# Patient Record
(demographics unavailable — no encounter records)

---

## 2024-11-15 NOTE — CARDIOLOGY SUMMARY
[No Ischemia] : no Ischemia [LVEF ___%] : LVEF [unfilled]% [___] : [unfilled] [de-identified] : 5/22-mild plaque without stenosis

## 2024-11-15 NOTE — HISTORY OF PRESENT ILLNESS
[FreeTextEntry1] : 81 year old female with hypertension, dyslipidemia, abnormal cardiac CTA, carotid artery atherosclerosis, orthostatic hypotension, anxiety.

## 2024-11-15 NOTE — REASON FOR VISIT
[FreeTextEntry1] : Jannet comes for BP f/u She reports that in July her PCP stopped her ramipril for elevated kidney numbers, her BP remained okay until 2 months ago where some elevation was noted- metoprolol was then increased to 25mg BID She has noticed that BP remains high and is running 140-160s at home Denies CP/SOB/palps She will be seeing nephrology next month

## 2024-11-15 NOTE — REVIEW OF SYSTEMS
[Negative] : Respiratory [Weight Gain (___ Lbs)] : no recent weight gain [Weight Loss (___ Lbs)] : no recent weight loss [SOB] : no shortness of breath [Dyspnea on exertion] : not dyspnea during exertion [Chest Discomfort] : no chest discomfort [Lower Ext Edema] : no extremity edema [Palpitations] : no palpitations [Orthopnea] : no orthopnea [Syncope] : no syncope [Dizziness] : no dizziness [Easy Bleeding] : no tendency for easy bleeding [Easy Bruising] : no tendency for easy bruising

## 2024-12-11 NOTE — REVIEW OF SYSTEMS
[Weight Gain (___ Lbs)] : no recent weight gain [Weight Loss (___ Lbs)] : no recent weight loss [SOB] : no shortness of breath [Dyspnea on exertion] : not dyspnea during exertion [Chest Discomfort] : no chest discomfort [Lower Ext Edema] : no extremity edema [Palpitations] : no palpitations [Orthopnea] : no orthopnea [Syncope] : no syncope [Dizziness] : no dizziness [Easy Bleeding] : no tendency for easy bleeding [Easy Bruising] : no tendency for easy bruising

## 2024-12-11 NOTE — REASON FOR VISIT
[FreeTextEntry1] : Jannet comes for BP f/u She reports that in July her PCP stopped her ramipril for elevated kidney numbers, her BP remained okay until 3 months ago where some elevation was noted- metoprolol was then increased to 25mg BID last visit amlodipine 5mg added Jannet reports improved BP at home 116-140 mostly 120/130s One day had significant swelling of RLE after wearing elastic knee brace on knee, resolved after removing   Denies CP/SOB/palps She will be seeing nephrology next month

## 2025-01-30 NOTE — HISTORY OF PRESENT ILLNESS
[FreeTextEntry1] : Date: 2022 9:00 AM    Patient Name: BENJAMIN GOODEN    MRN: [id number]    Account Number:    [id number]    Gender: Female     (age): 2/15/1943 (79)    Instrument(s):    [id number]-[id number])    Attending/Fellow:    Thom Pinedo MD    Technician:    Shonda Hidalgo, Endoscopy Technician    Procedure Room #:    PROCEDURE ROOM 1        Anesthesia Provider:    Brain Barba MD    Nurse:    Alexus Chavez MD    Indications:    Abdominal pain: 789.00 - R10.9    Constipation: 564.09 - K59.09    Procedure:    The procedure, indications, preparation and potential complications were  explained to the patient, who indicated understanding and signed the  corresponding consent forms. MAC was administered by the anesthesiologist.  Continuous pulse oximetry and blood pressure monitoring were used throughout the  procedure. Supplemental oxygen was used. Patient was placed in left lateral  decubitus position. Digital exam was normal. The colonoscope was introduced  through the rectum and advanced under direct visualization until cecum was  reached. The appendiceal orifice and the ileocecal valve were identified. The  terminal ileum was identified. Careful visualization was performed as the  instrument was withdrawn. Patient tolerance to procedure was excellent. The  procedure was not difficult.    Limitations/Complications:    There were no apparent limitations or complications    Findings:    Excavated lesions Several diverticula were seen in the the left side of the  colon. Diverticulosis appeared to be of moderate severity.    Protruding lesions Small grade/stage II internal hemorrhoids were noted.    Impressions:    Moderate diverticulosis of the the left side of the colon.    Grade/Stage II internal hemorrhoids.    Plan:    High Fiber Diet    Colonoscopy in 3-5 years pending results    Additional Notes:    L -sided diverticulosis w loop fixation and    lumen narrowing,  2 + hypertrophy w 1+ spasm

## 2025-01-30 NOTE — ASSESSMENT
[FreeTextEntry1] : Feels sensation of incomplete evacuation.  Patient prefers a CT over repeating colonoscopy at this time.  Continue fiber, recommend increasing to bulk stools. Continue adequate water intake.  Recommend colonoscopy pending CT results.

## 2025-01-30 NOTE — HISTORY OF PRESENT ILLNESS
[FreeTextEntry1] : Date: 2022 9:00 AM    Patient Name: BENJAMIN GOODEN    MRN: [id number]    Account Number:    [id number]    Gender: Female     (age): 2/15/1943 (79)    Instrument(s):    [id number])    Attending/Fellow:    Thom Pinedo MD    Technician:    Shonda Hidalgo, Endoscopy Technician    Procedure Room #:    PROCEDURE ROOM 1        Anesthesia Provider:    Brain Barba MD    Nurse:    Alexus Chavez MD    Indications:    Abdominal pain: 789.00 - R10.9    Constipation: 564.09 - K59.09    Procedure:    The procedure, indications, preparation and potential complications were  explained to the patient, who indicated understanding and signed the  corresponding consent forms. MAC was administered by the anesthesiologist.  Continuous pulse oximetry and blood pressure monitoring were used throughout the  procedure. Supplemental oxygen was used. Patient was placed in left lateral  decubitus position. Digital exam was normal. The colonoscope was introduced  through the rectum and advanced under direct visualization until cecum was  reached. The appendiceal orifice and the ileocecal valve were identified. The  terminal ileum was identified. Careful visualization was performed as the  instrument was withdrawn. Patient tolerance to procedure was excellent. The  procedure was not difficult.    Limitations/Complications:    There were no apparent limitations or complications    Findings:    Excavated lesions Several diverticula were seen in the the left side of the  colon. Diverticulosis appeared to be of moderate severity.    Protruding lesions Small grade/stage II internal hemorrhoids were noted.    Impressions:    Moderate diverticulosis of the the left side of the colon.    Grade/Stage II internal hemorrhoids.    Plan:    High Fiber Diet    Colonoscopy in 3-5 years pending results    Additional Notes:    L -sided diverticulosis w loop fixation and    lumen narrowing,  2 + hypertrophy w 1+ spasm

## 2025-03-20 NOTE — ASSESSMENT
[FreeTextEntry1] : -complicated diverticulitis - resolved -known chronically edematous spastic sigmoid - seeing Judith and Dr. Jefferson at [facility] next wk for surgical consultation/f/u.  Will repeat CT scan and probably move in direction of preop colonoscopy after scan, adequate convalescence, and surgical f/u.  High fiber diet.  -dyspepsia - will start ppi - consider EGD at time of colonoscopy - avoid NSAIDs.  PMD/consultation/hospital notes and Labs/imaging/prior endoscopic results reviewed to extent noted in HPI; and, if procedure code billed on this visit for lab draw, this serves to signify that labs were drawn here in this office. Pt also advised that any studies ordered, if prior authorization required it may take up to a week to confirm - and if pt has not heard RE: scheduling by a week, they should call this office.

## 2025-03-20 NOTE — ASSESSMENT
[FreeTextEntry1] : -complicated diverticulitis - resolved -known chronically edematous spastic sigmoid - seeing Judith and Dr. Jefferson at [facility] next wk for surgical consultation/f/u.  Will repeat CT scan and probably move in direction of preop colonoscopy after scan, adequate convalescence, and surgical f/u.  High fiber diet.  -dyspepsia - will start ppi - consider EGD at time of colonoscopy - avoid NSAIDs.  PMD/consultation/hospital notes and Labs/imaging/prior endoscopic results reviewed to extent noted in HPI; and, if procedure code billed on this visit for lab draw, this serves to signify that labs were drawn here in this office. Pt also advised that any studies ordered, if prior authorization required it may take up to a week to confirm - and if pt has not heard RE: scheduling by a week, they should call this office.

## 2025-03-20 NOTE — ASSESSMENT
[FreeTextEntry1] : -complicated diverticulitis - resolved -known chronically edematous spastic sigmoid - seeing Judith and Dr. Jefferson at [facility] next wk for surgical consultation/f/u.  Will repeat CT scan and probably move in direction of preop colonoscopy after scan, adequate convalescence, and surgical f/u.  High fiber diet.  -dyspepsia - will start ppi - consider EGD at time of colonoscopy - avoid NSAIDs.  PMD/consultation/hospital notes and Labs/imaging/prior endoscopic results reviewed to extent noted in HPI; and, if procedure code billed on this visit for lab draw, this serves to signify that labs were drawn here in this office. Pt also advised that any studies ordered, if prior authorization required it may take up to a week to confirm - and if pt has not heard RE: scheduling by a week, they should call this office.

## 2025-03-20 NOTE — HISTORY OF PRESENT ILLNESS
[FreeTextEntry1] : 82f CAD, HTN, GERD,  Urogyn surgery '23 (CESILIA, uterus pexy, rectocele repair, sling - Dr. Juárez), long hx intermittent lower abd cramps / IBS - admitted with acute worsening 2/2025 - CT scan 2/17/25: large low-density rectouterine lesion suspicous for abscess, additional smaller perisigmoidal collection or phlegmon (MRI rec). Managed conservatively w/ bowel rest/abx/drain - which has since been removed 3/5/25 by IR.  She seems to be back to baseline intermittent lower abdominal cramps and has good and bad days.  She has some postprandial early satiety that is new since this episode.  Hard time gaining wt back.  Last colonoscopy - Dr. Pinedo 7/2022 - moderate L sided diverticulosis, int hemorrhoid, w/ spasm, hypertrophy  (Prior colonoscopy '14 - severe diverticulosis w/ lumen narrowing and loop fixation).  Soc:  no tobacco or significant EtOH FHx: no FHx GI malignancy or IBD  ROS: Constitutional:: no weight loss, fevers ENT: no deafness Eyes: not blind Neck: no LN Chest: no dyspnea/cough Cardiac: no chest pain Vascular: no leg swelling GI: no abdominal pain, nausea, vomiting, diarrhea, constipation, rectal bleeding, dysphagia, melena unless otherwise noted in HPI : no dysuria, dark urine Skin: no rashes, jaundice Heme: no bleeding Endocrine: no DM unless otherwise stated in HPI  Px: (VS noted below) General: NAD Eyes: anicteric Oropharynx:  clear Neck: no LN Chest: normal respiratory effort CVS: regular Abd: soft, NT, ND, +BS, no HSM Ext: no atrophy Neuro: grossly nonfocal  Labs/imaging/prior endoscopic results reviewed to the extent available and noted in HPI

## 2025-03-27 NOTE — PHYSICAL EXAM
[General Appearance - Alert] : alert [General Appearance - In No Acute Distress] : in no acute distress [Fluency] : fluency intact [Comprehension] : comprehension intact [Cranial Nerves Optic (II)] : visual acuity intact bilaterally,  pupils equal round and reactive to light [Cranial Nerves Oculomotor (III)] : extraocular motion intact [Cranial Nerves Trigeminal (V)] : facial sensation intact symmetrically [Cranial Nerves Facial (VII)] : face symmetrical [Cranial Nerves Vestibulocochlear (VIII)] : hearing was intact bilaterally [Cranial Nerves Glossopharyngeal (IX)] : tongue and palate midline [Cranial Nerves Accessory (XI - Cranial And Spinal)] : head turning and shoulder shrug symmetric [Cranial Nerves Hypoglossal (XII)] : there was no tongue deviation with protrusion [Motor Strength] : muscle strength was normal in all four extremities [Sensation Tactile Decrease] : light touch was intact [Abnormal Walk] : normal gait [2+] : Ankle jerk left 2+ [Normal] : normal [Able to toe walk] : the patient was able to toe walk [FreeTextEntry6] : left DF 3, Left EHL 4 Right DF 4- [Able to heel walk] : the patient was not able to heel walk

## 2025-03-27 NOTE — CONSULT LETTER
[Dear  ___] : Dear  [unfilled], [Consult Letter:] : I had the pleasure of evaluating your patient, [unfilled]. [Please see my note below.] : Please see my note below. [Consult Closing:] : Thank you very much for allowing me to participate in the care of this patient.  If you have any questions, please do not hesitate to contact me. [Sincerely,] : Sincerely, [FreeTextEntry3] : Rigoberto Sullivan M.D.

## 2025-03-27 NOTE — HISTORY OF PRESENT ILLNESS
[de-identified] : BENJAMIN GOODEN is an 82 year old female with a PMH of CAD, HTN, GERD, Urogyn surgery 2023, pelvic abscess, anxiety, diverticulosis of colon, Lyme disease,  who presents to the office today at the request of Dr. Heck for neurosurgical consultation due to lumbar radiculopathy with left greater than right foot drop.   The pain (now improved) was initially right gluteal area, right knee, and right shin non radiating.  It started January 2025 after straining to go to the bathroom.  There has been no known trauma precipitating the pain.  The patient endorses numbness of extremities and weakness of extremities (possibly started in January). Reports cramping at night. Denies bowel or bladder incontinence and gait disturbance.  She has tried DELTA 60% relief 1/27, 85% relief 2/10 with Dr. Norwood, PT, and pain medications including Prednisone in the last month with relief.  She has undergone imaging in the form of MRI lumbar spine which demonstrated lumbar spondylosis. Grade 1 spondylolisthesis at L4-L5. Slight retrolisthesis of L2 on L3. Multilevel annular disc bulge. Multilevel facet arthropathy. Multilevel foraminal narrowing. Mild spinal stenosis at L1-L2. Mild to moderate spinal stenosis at L3-L4. Moderate spinal stenosis at L2-L3. Severe spinal stenosis at L4-L5. There has been worsening of spinal stenosis L2-L3, L3-L4, and L4-L5.  She was recommended to obtain an AFO brace. She plays tennis but has not played recently.  EMG/NCV 3/2025: Ongoing chronic active lumbosacral intraspinal canal lesions radiculopathy involving bilateral L4 nerve roots. No evidence of lumbosacral plexopathy focal lower extremity mononeuropathy or large fiber polyneuropathy.  Surg Hx:  cataract surgery, uterine prolapse surgery, pelvic abscess drainage   Meds:  amlodipine, aspirin 81 mg, atorvastatin, buproprion, metoprolol, probiotic, methadimine, estradiol, lutein, buspar 15 mg total per day   Allergies: doxycycline-Hives  Soc Hx: nonsmoker, no EtOH, lives with , works as retired RN

## 2025-03-27 NOTE — ASSESSMENT
[FreeTextEntry1] : BENJAMIN GOODEN is an 82 year old female with a PMH of CAD, HTN, GERD, Urogyn surgery 2023, anxiety, diverticulosis of colon, Lyme disease, who presents to the office today at the request of Dr. Heck for neurosurgical consultation due to lumbar radiculopathy with left greater than right foot drop.   I reviewed her MRI lumbar spine in the office today which demonstrates multilevel lumbar spondylosis, most significant at L4-5 where there is severe central canal and lateral recess stenosis.  There is a grade 1 anterolisthesis with visible bilateral facet arthropathy.   I discussed with her that given the extent of her weakness I would favor a more urgent surgical intervention rather than continuing with conservative measures. She is seeing Dr. Isbell (GI) and planned to see Dr. Dubois (surgeon) at [facility] on Monday for possible GI surgery related to the diverticulitis and her recently treated abscess.  We will have to coordinate, as her long-term neurologic function is quite time sensitive.  I did express to her that given the extent of weakness as well as the length of time that this has been going on, there is no guarantee of full recovery.  In the end, I recommend additional imaging in the form of CT lumbar spine to better visualize the extent of osteophyte complex development and facet arthropathy, as well as upright lumbar spine X-rays with flexion and extension views to demonstrate the regional spinal alignment and to rule out any visible dynamic instability.   I suggested to her that she will likely need a lumbar laminectomy and possible fusion based on the further imaging that she will obtain.  The patient may return to the office once imaging completed for further treatment planning. She was also advised to reach out to us sooner if she is to develop weakness or bowel/bladder symptoms.  I spent 60 minutes relative to this patient encounter.

## 2025-03-27 NOTE — ASSESSMENT
[FreeTextEntry1] : BENJAMIN GOODEN is an 82 year old female with a PMH of CAD, HTN, GERD, Urogyn surgery 2023, anxiety, diverticulosis of colon, Lyme disease, who presents to the office today at the request of Dr. Heck for neurosurgical consultation due to lumbar radiculopathy with left greater than right foot drop.   I reviewed her MRI lumbar spine in the office today which demonstrates multilevel lumbar spondylosis, most significant at L4-5 where there is severe central canal and lateral recess stenosis.  There is a grade 1 anterolisthesis with visible bilateral facet arthropathy.   I discussed with her that given the extent of her weakness I would favor a more urgent surgical intervention rather than continuing with conservative measures. She is seeing Dr. Isbell (GI) and planned to see Dr. Dubois (surgeon) at [facility] on Monday for possible GI surgery related to the diverticulitis and her recently treated abscess.  We will have to coordinate, as her long-term neurologic function is quite time sensitive.  I did express to her that given the extent of weakness as well as the length of time that this has been going on, there is no guarantee of full recovery.  In the end, I recommend additional imaging in the form of CT lumbar spine to better visualize the extent of osteophyte complex development and facet arthropathy, as well as upright lumbar spine X-rays with flexion and extension views to demonstrate the regional spinal alignment and to rule out any visible dynamic instability.   I suggested to her that she will likely need a lumbar laminectomy and possible fusion based on the further imaging that she will obtain.  The patient may return to the office once imaging completed for further treatment planning. She was also advised to reach out to us sooner if she is to develop weakness or bowel/bladder symptoms.  I spent 60 minutes relative to this patient encounter.

## 2025-03-27 NOTE — HISTORY OF PRESENT ILLNESS
[de-identified] : BENJAMIN GOODEN is an 82 year old female with a PMH of CAD, HTN, GERD, Urogyn surgery 2023, pelvic abscess, anxiety, diverticulosis of colon, Lyme disease,  who presents to the office today at the request of Dr. Heck for neurosurgical consultation due to lumbar radiculopathy with left greater than right foot drop.   The pain (now improved) was initially right gluteal area, right knee, and right shin non radiating.  It started January 2025 after straining to go to the bathroom.  There has been no known trauma precipitating the pain.  The patient endorses numbness of extremities and weakness of extremities (possibly started in January). Reports cramping at night. Denies bowel or bladder incontinence and gait disturbance.  She has tried DELTA 60% relief 1/27, 85% relief 2/10 with Dr. Norwood, PT, and pain medications including Prednisone in the last month with relief.  She has undergone imaging in the form of MRI lumbar spine which demonstrated lumbar spondylosis. Grade 1 spondylolisthesis at L4-L5. Slight retrolisthesis of L2 on L3. Multilevel annular disc bulge. Multilevel facet arthropathy. Multilevel foraminal narrowing. Mild spinal stenosis at L1-L2. Mild to moderate spinal stenosis at L3-L4. Moderate spinal stenosis at L2-L3. Severe spinal stenosis at L4-L5. There has been worsening of spinal stenosis L2-L3, L3-L4, and L4-L5.  She was recommended to obtain an AFO brace. She plays tennis but has not played recently.  EMG/NCV 3/2025: Ongoing chronic active lumbosacral intraspinal canal lesions radiculopathy involving bilateral L4 nerve roots. No evidence of lumbosacral plexopathy focal lower extremity mononeuropathy or large fiber polyneuropathy.  Surg Hx:  cataract surgery, uterine prolapse surgery, pelvic abscess drainage   Meds:  amlodipine, aspirin 81 mg, atorvastatin, buproprion, metoprolol, probiotic, methadimine, estradiol, lutein, buspar 15 mg total per day   Allergies: doxycycline-Hives  Soc Hx: nonsmoker, no EtOH, lives with , works as retired RN

## 2025-03-27 NOTE — ASSESSMENT
[FreeTextEntry1] : BENJAMIN GOODEN is an 82 year old female with a PMH of CAD, HTN, GERD, Urogyn surgery 2023, anxiety, diverticulosis of colon, Lyme disease, who presents to the office today at the request of Dr. Heck for neurosurgical consultation due to lumbar radiculopathy with left greater than right foot drop.   I reviewed her MRI lumbar spine in the office today which demonstrates multilevel lumbar spondylosis, most significant at L4-5 where there is severe central canal and lateral recess stenosis.  There is a grade 1 anterolisthesis with visible bilateral facet arthropathy.   I discussed with her that given the extent of her weakness I would favor a more urgent surgical intervention rather than continuing with conservative measures. She is seeing Dr. Isbell (GI) and planned to see Dr. Dubois (surgeon) at [facility] on Monday for possible GI surgery related to the diverticulitis and her recently treated abscess.  We will have to coordinate, as her long-term neurologic function is quite time sensitive.  I did express to her that given the extent of weakness as well as the length of time that this has been going on, there is no guarantee of full recovery.  In the end, I recommend additional imaging in the form of CT lumbar spine to better visualize the extent of osteophyte complex development and facet arthropathy, as well as upright lumbar spine X-rays with flexion and extension views to demonstrate the regional spinal alignment and to rule out any visible dynamic instability.   I suggested to her that she will likely need a lumbar laminectomy and possible fusion based on the further imaging that she will obtain.  The patient may return to the office once imaging completed for further treatment planning. She was also advised to reach out to us sooner if she is to develop weakness or bowel/bladder symptoms.  I spent 60 minutes relative to this patient encounter.

## 2025-03-27 NOTE — HISTORY OF PRESENT ILLNESS
[de-identified] : BENJAMIN GOODEN is an 82 year old female with a PMH of CAD, HTN, GERD, Urogyn surgery 2023, pelvic abscess, anxiety, diverticulosis of colon, Lyme disease,  who presents to the office today at the request of Dr. Heck for neurosurgical consultation due to lumbar radiculopathy with left greater than right foot drop.   The pain (now improved) was initially right gluteal area, right knee, and right shin non radiating.  It started January 2025 after straining to go to the bathroom.  There has been no known trauma precipitating the pain.  The patient endorses numbness of extremities and weakness of extremities (possibly started in January). Reports cramping at night. Denies bowel or bladder incontinence and gait disturbance.  She has tried DELTA 60% relief 1/27, 85% relief 2/10 with Dr. Norwood, PT, and pain medications including Prednisone in the last month with relief.  She has undergone imaging in the form of MRI lumbar spine which demonstrated lumbar spondylosis. Grade 1 spondylolisthesis at L4-L5. Slight retrolisthesis of L2 on L3. Multilevel annular disc bulge. Multilevel facet arthropathy. Multilevel foraminal narrowing. Mild spinal stenosis at L1-L2. Mild to moderate spinal stenosis at L3-L4. Moderate spinal stenosis at L2-L3. Severe spinal stenosis at L4-L5. There has been worsening of spinal stenosis L2-L3, L3-L4, and L4-L5.  She was recommended to obtain an AFO brace. She plays tennis but has not played recently.  EMG/NCV 3/2025: Ongoing chronic active lumbosacral intraspinal canal lesions radiculopathy involving bilateral L4 nerve roots. No evidence of lumbosacral plexopathy focal lower extremity mononeuropathy or large fiber polyneuropathy.  Surg Hx:  cataract surgery, uterine prolapse surgery, pelvic abscess drainage   Meds:  amlodipine, aspirin 81 mg, atorvastatin, buproprion, metoprolol, probiotic, methadimine, estradiol, lutein, buspar 15 mg total per day   Allergies: doxycycline-Hives  Soc Hx: nonsmoker, no EtOH, lives with , works as retired RN

## 2025-04-09 NOTE — PHYSICAL EXAM
[General Appearance - Alert] : alert [General Appearance - In No Acute Distress] : in no acute distress [Fluency] : fluency intact [Comprehension] : comprehension intact [Cranial Nerves Optic (II)] : visual acuity intact bilaterally,  pupils equal round and reactive to light [Cranial Nerves Oculomotor (III)] : extraocular motion intact [Cranial Nerves Trigeminal (V)] : facial sensation intact symmetrically [Cranial Nerves Facial (VII)] : face symmetrical [Cranial Nerves Vestibulocochlear (VIII)] : hearing was intact bilaterally [Cranial Nerves Glossopharyngeal (IX)] : tongue and palate midline [Cranial Nerves Accessory (XI - Cranial And Spinal)] : head turning and shoulder shrug symmetric [Cranial Nerves Hypoglossal (XII)] : there was no tongue deviation with protrusion [Sensation Tactile Decrease] : light touch was intact [2+] : Ankle jerk left 2+ [Able to toe walk] : the patient was able to toe walk [FreeTextEntry6] : left DF 3, Left EHL 4 Right DF 4- [Able to heel walk] : the patient was not able to heel walk

## 2025-04-09 NOTE — HISTORY OF PRESENT ILLNESS
[de-identified] : The patient has given verbal consent for this telehealth visit using two-way audio and video technology.  The patient is currently located at home [address] [place] [address] [phone], and I am located at my office at [facility].  Benjamin Gooden presents for follow up for lumbar radiculopathy and bilateral foot drop. She underwent CT lumbar spine and x-ray lumbar spine prior to her appointment today. CT lumbar spine demonstrated multilevel  degenerative disc disease.  L4-L5: Moderate to severe spinal canal stenosis. L1-L2, L2-L3, and L3-L4: Mild to moderate spinal canal stenosis.  Variable neural foraminal stenosis. Xrays demonstrated Grade 1 spondylolisthesis of L4 on L5. Mild retrolisthesis of L1 on L2. Multi leveled degenerative disc disease greatest at the L2-3 and L5-S1 levels.  No evidence of instability. Mild spondylosis deformans.   3/26/25: BENJAMIN GOODEN is an 82 year old female with a PMH of CAD, HTN, GERD, Urogyn surgery 2023, pelvic abscess, anxiety, diverticulosis of colon, Lyme disease,  who presents to the office today at the request of Dr. Heck for neurosurgical consultation due to lumbar radiculopathy with left greater than right foot drop.   The pain (now improved) was initially right gluteal area, right knee, and right shin non radiating.  It started January 2025 after straining to go to the bathroom.  There has been no known trauma precipitating the pain.  The patient endorses numbness of extremities and weakness of extremities (possibly started in January). Reports cramping at night. Denies bowel or bladder incontinence and gait disturbance.  She has tried DELTA 60% relief 1/27, 85% relief 2/10 with Dr. Norwood, PT, and pain medications including Prednisone in the last month with relief.  She has undergone imaging in the form of MRI lumbar spine which demonstrated lumbar spondylosis. Grade 1 spondylolisthesis at L4-L5. Slight retrolisthesis of L2 on L3. Multilevel annular disc bulge. Multilevel facet arthropathy. Multilevel foraminal narrowing. Mild spinal stenosis at L1-L2. Mild to moderate spinal stenosis at L3-L4. Moderate spinal stenosis at L2-L3. Severe spinal stenosis at L4-L5. There has been worsening of spinal stenosis L2-L3, L3-L4, and L4-L5.  She was recommended to obtain an AFO brace. She plays tennis but has not played recently.  EMG/NCV 3/2025: Ongoing chronic active lumbosacral intraspinal canal lesions radiculopathy involving bilateral L4 nerve roots. No evidence of lumbosacral plexopathy focal lower extremity mononeuropathy or large fiber polyneuropathy.  Surg Hx:  cataract surgery, uterine prolapse surgery, pelvic abscess drainage   Meds:  amlodipine, aspirin 81 mg, atorvastatin, buproprion, metoprolol, probiotic, methadimine, estradiol, lutein, buspar 15 mg total per day   Allergies: doxycycline-Hives  Soc Hx: nonsmoker, no EtOH, lives with , works as retired RN

## 2025-04-09 NOTE — ASSESSMENT
[FreeTextEntry1] : BENJAMIN GOODEN is an 82 year old female with a PMH of CAD, HTN, GERD, Urogyn surgery 2023, anxiety, diverticulosis of colon, Lyme disease, presenting for follow up due to lumbar radiculopathy with left greater than right foot drop.   I reviewed her CT and x-ray lumbar spine today.  I reviewed her MRI lumbar spine in the office today which demonstrates multilevel lumbar spondylosis, most significant at L4-5 where there is severe central canal and lateral recess stenosis.  There is a grade 1 anterolisthesis with visible bilateral facet arthropathy.   I discussed with her that given the extent of her weakness I would favor a more urgent surgical intervention rather than continuing with conservative measures. She is seeing Dr. Isbell (GI) and planned to see Dr. Dubois (surgeon) at [facility] on Monday for possible GI surgery related to the diverticulitis and her recently treated abscess.  We will have to coordinate, as her long-term neurologic function is quite time sensitive.  I did express to her that given the extent of weakness as well as the length of time that this has been going on, there is no guarantee of full recovery.  In the end I recommend surgical intervention in the form of ***lumbar laminectomy and fusion.  The patient will need cardiac clearance with Dr. Michelle Altamirano and will need NP clearance with the Presurgical Testing Department at [place] prior to the procedure.  At the end of the discussion, the patient opted to move forward with the above plan.  Our office will reach out to coordinate a surgical date in the coming weeks.  The patient understands the plan of care and is in agreement.  All questions answered to patient satisfaction.   I spent 45 minutes relative to this patient encounter.

## 2025-04-23 NOTE — REASON FOR VISIT
[FreeTextEntry1] : Jannet Bradshaw presents for follow up visit.   S/p [facility] admission from 2/17-2/22/25 with intraabdominal abscess. On admission, afebrile with leukocytosis. S/p IR drainage of abscess on 2/20/25. Post procedure had rigors and abd pain. Found with elevated lactate, sepsis protocol initiated. She received broad spectrum antibiotics. She was discharged on Augmentin and probiotics.   She is following up with GI Dr. Isbell and colorectal surgery Dr. Kumari with a plan for colonoscopy and colorectal surgery 2 weeks after colonoscopy. Urogynecologist Dr. Juárez is also participating in her anticipated procedure.     She is also following with neurosurgery for lumbar radiculopathy and bilateral foot drop, possible laminectomy. She sought a second opinion and was advised to proceed with GI surgery first.   Ms. Bradshaw denies chest pain, SOB, palpitations, dizziness or syncope.

## 2025-04-23 NOTE — REASON FOR VISIT
[FreeTextEntry1] : Jannet Bradshaw presents for follow up visit.   S/p [facility] admission from 2/17-2/22/25 with intraabdominal abscess. On admission, afebrile with leukocytosis. S/p IR drainage of abscess on 2/20/25. Post procedure had rigors and abd pain. Found with elevated lactate, sepsis protocol initiated. She received broad spectrum antibiotics. She was discharged on Augmentin and probiotics.   She is following up with GI Dr. Isbell and colorectal surgery Dr. Kumari with a plan for colonoscopy and colorectal surgery 2 weeks after colonoscopy. Urogynecologist Dr. Juárez is also participating in her anticipated procedure.     She is also following with neurosurgery for lumbar radiculopathy and bilateral foot drop, possible laminectomy. She sought a second opinion and was advised to proceed with GI surgery first.   Ms. Bradshaw denies chest pain, SOB, palpitations, dizziness or syncope.

## 2025-04-23 NOTE — ADDENDUM
[FreeTextEntry1] : Nuclear stress done 4/23/25 is nonischemic. There is no evidence of active ischemia or CHF, there is no cardiac contraindication to planned procedure/surgery. Michelle Altamirano MD

## 2025-04-23 NOTE — CARDIOLOGY SUMMARY
[No Ischemia] : no Ischemia [LVEF ___%] : LVEF [unfilled]% [___] : [unfilled] [de-identified] : 4/15/25 NSR [de-identified] : 3/21/24-divya al, rare ectopy [de-identified] : 6/17/23 Pharmacologic nuclear stress test 1. Normal myocardial perfusion scan, with no evidence of infarction or inducible ischemia. 2. Baseline electrocardiogram: sinus bradycardia at a rate of 56 bpm with no arrhythmias and no sigificant ST abnomalities. 3. Stress electrocardiogram: No ischemic ST segment changes. 4. The left ventricle is normal in function and normal in size. 5. The stress left ventricular EF% is 77 %. 6. The resting left ventricular EF% is 75 %.  [de-identified] : TTE 2/12/25 1. Left ventricular cavity is normal in size. Left ventricular wall thickness is normal. Left ventricular systolic function is normal with an ejection fraction of 59 % by Glynn's method of disks. There are no regional wall motion abnormalities seen. 2. Normal left ventricular diastolic function. 3. Normal right ventricular cavity size, with normal wall thickness, and normal right ventricular systolic function. 4. Mild tricuspid regurgitation. 5. Estimated pulmonary artery systolic pressure is 20 mmHg, consistent with normal pulmonary artery pressure. 6. Trace mitral regurgitation. 7. Trace pulmonic regurgitation. 8. Compared to 6/21/23 findings are similar.  [de-identified] : 5/22-mild plaque without stenosis

## 2025-04-23 NOTE — CARDIOLOGY SUMMARY
[No Ischemia] : no Ischemia [LVEF ___%] : LVEF [unfilled]% [___] : [unfilled] [de-identified] : 4/15/25 NSR [de-identified] : 3/21/24-divya al, rare ectopy [de-identified] : 6/17/23 Pharmacologic nuclear stress test 1. Normal myocardial perfusion scan, with no evidence of infarction or inducible ischemia. 2. Baseline electrocardiogram: sinus bradycardia at a rate of 56 bpm with no arrhythmias and no sigificant ST abnomalities. 3. Stress electrocardiogram: No ischemic ST segment changes. 4. The left ventricle is normal in function and normal in size. 5. The stress left ventricular EF% is 77 %. 6. The resting left ventricular EF% is 75 %.  [de-identified] : TTE 2/12/25 1. Left ventricular cavity is normal in size. Left ventricular wall thickness is normal. Left ventricular systolic function is normal with an ejection fraction of 59 % by Glynn's method of disks. There are no regional wall motion abnormalities seen. 2. Normal left ventricular diastolic function. 3. Normal right ventricular cavity size, with normal wall thickness, and normal right ventricular systolic function. 4. Mild tricuspid regurgitation. 5. Estimated pulmonary artery systolic pressure is 20 mmHg, consistent with normal pulmonary artery pressure. 6. Trace mitral regurgitation. 7. Trace pulmonic regurgitation. 8. Compared to 6/21/23 findings are similar.  [de-identified] : 5/22-mild plaque without stenosis

## 2025-04-23 NOTE — REASON FOR VISIT
[FreeTextEntry1] : Jannet Bradshaw presents for follow up visit.   S/p [facility] admission from 2/17-2/22/25 with intraabdominal abscess. On admission, afebrile with leukocytosis. S/p IR drainage of abscess on 2/20/25. Post procedure had rigors and abd pain. Found with elevated lactate, sepsis protocol initiated. She received broad spectrum antibiotics. She was discharged on Augmentin and probiotics.   She is following up with GI Dr. Isbell and colorectal surgery Dr. Kumari with a plan for colonoscopy and colorectal surgery 2 weeks after colonoscopy. Urogynecologist Dr. Juárez is also participating in her anticipated procedure.     She is also following with neurosurgery for lumbar radiculopathy and bilateral foot drop, possible laminectomy. She sought a second opinion and was advised to proceed with GI surgery first.   Ms. Bradshaw denies chest pain, SOB, palpitations, dizziness or syncope.

## 2025-04-23 NOTE — CARDIOLOGY SUMMARY
[No Ischemia] : no Ischemia [LVEF ___%] : LVEF [unfilled]% [___] : [unfilled] [de-identified] : 4/15/25 NSR [de-identified] : 3/21/24-divya al, rare ectopy [de-identified] : 6/17/23 Pharmacologic nuclear stress test 1. Normal myocardial perfusion scan, with no evidence of infarction or inducible ischemia. 2. Baseline electrocardiogram: sinus bradycardia at a rate of 56 bpm with no arrhythmias and no sigificant ST abnomalities. 3. Stress electrocardiogram: No ischemic ST segment changes. 4. The left ventricle is normal in function and normal in size. 5. The stress left ventricular EF% is 77 %. 6. The resting left ventricular EF% is 75 %.  [de-identified] : TTE 2/12/25 1. Left ventricular cavity is normal in size. Left ventricular wall thickness is normal. Left ventricular systolic function is normal with an ejection fraction of 59 % by Glynn's method of disks. There are no regional wall motion abnormalities seen. 2. Normal left ventricular diastolic function. 3. Normal right ventricular cavity size, with normal wall thickness, and normal right ventricular systolic function. 4. Mild tricuspid regurgitation. 5. Estimated pulmonary artery systolic pressure is 20 mmHg, consistent with normal pulmonary artery pressure. 6. Trace mitral regurgitation. 7. Trace pulmonic regurgitation. 8. Compared to 6/21/23 findings are similar.  [de-identified] : 5/22-mild plaque without stenosis

## 2025-04-23 NOTE — CARDIOLOGY SUMMARY
[No Ischemia] : no Ischemia [LVEF ___%] : LVEF [unfilled]% [___] : [unfilled] [de-identified] : 4/15/25 NSR [de-identified] : 3/21/24-divya al, rare ectopy [de-identified] : 6/17/23 Pharmacologic nuclear stress test 1. Normal myocardial perfusion scan, with no evidence of infarction or inducible ischemia. 2. Baseline electrocardiogram: sinus bradycardia at a rate of 56 bpm with no arrhythmias and no sigificant ST abnomalities. 3. Stress electrocardiogram: No ischemic ST segment changes. 4. The left ventricle is normal in function and normal in size. 5. The stress left ventricular EF% is 77 %. 6. The resting left ventricular EF% is 75 %.  [de-identified] : TTE 2/12/25 1. Left ventricular cavity is normal in size. Left ventricular wall thickness is normal. Left ventricular systolic function is normal with an ejection fraction of 59 % by Glynn's method of disks. There are no regional wall motion abnormalities seen. 2. Normal left ventricular diastolic function. 3. Normal right ventricular cavity size, with normal wall thickness, and normal right ventricular systolic function. 4. Mild tricuspid regurgitation. 5. Estimated pulmonary artery systolic pressure is 20 mmHg, consistent with normal pulmonary artery pressure. 6. Trace mitral regurgitation. 7. Trace pulmonic regurgitation. 8. Compared to 6/21/23 findings are similar.  [de-identified] : 5/22-mild plaque without stenosis

## 2025-04-23 NOTE — REASON FOR VISIT
[FreeTextEntry1] : Jannet Bradshaw presents for follow up visit.   S/p [facility] admission from 2/17-2/22/25 with intraabdominal abscess. On admission, afebrile with leukocytosis. S/p IR drainage of abscess on 2/20/25. Post procedure had rigors and abd pain. Found with elevated lactate, sepsis protocol initiated. She received broad spectrum antibiotics. She was discharged on Augmentin and probiotics.   She is following up with GI Dr. Isbell and colorectal surgery Dr. Kumari with a plan for colonoscopy and colorectal surgery 2 weeks after colonoscopy. Urogynecologist Dr. Juárez is also participating in her anticipated procedure.     She is also following with neurosurgery for lumbar radiculopathy and bilateral foot drop, possible laminectomy. She sought a second opinion and was advised to proceed with GI surgery first.   Ms. Bradshaw denies chest pain, SOB, palpitations, dizziness or syncope.

## 2025-04-23 NOTE — PHYSICAL EXAM
[No Acute Distress] : no acute distress [Normal S1, S2] : normal S1, S2 [No Murmur] : no murmur [Clear Lung Fields] : clear lung fields [Good Air Entry] : good air entry [No Respiratory Distress] : no respiratory distress  [No Edema] : no edema [No Rash] : no rash [Moves all extremities] : moves all extremities [No Focal Deficits] : no focal deficits [Normal Speech] : normal speech [Alert and Oriented] : alert and oriented [Normal memory] : normal memory [de-identified] : bilateral foot drop

## 2025-04-23 NOTE — PHYSICAL EXAM
[No Acute Distress] : no acute distress [Normal S1, S2] : normal S1, S2 [No Murmur] : no murmur [Clear Lung Fields] : clear lung fields [Good Air Entry] : good air entry [No Respiratory Distress] : no respiratory distress  [No Edema] : no edema [No Rash] : no rash [Moves all extremities] : moves all extremities [No Focal Deficits] : no focal deficits [Normal Speech] : normal speech [Alert and Oriented] : alert and oriented [Normal memory] : normal memory [de-identified] : bilateral foot drop

## 2025-04-23 NOTE — PHYSICAL EXAM
[No Acute Distress] : no acute distress [Normal S1, S2] : normal S1, S2 [No Murmur] : no murmur [Clear Lung Fields] : clear lung fields [Good Air Entry] : good air entry [No Respiratory Distress] : no respiratory distress  [No Edema] : no edema [No Rash] : no rash [Moves all extremities] : moves all extremities [No Focal Deficits] : no focal deficits [Normal Speech] : normal speech [Alert and Oriented] : alert and oriented [Normal memory] : normal memory [de-identified] : bilateral foot drop

## 2025-04-23 NOTE — PHYSICAL EXAM
[No Acute Distress] : no acute distress [Normal S1, S2] : normal S1, S2 [No Murmur] : no murmur [Clear Lung Fields] : clear lung fields [Good Air Entry] : good air entry [No Respiratory Distress] : no respiratory distress  [No Edema] : no edema [No Rash] : no rash [Moves all extremities] : moves all extremities [No Focal Deficits] : no focal deficits [Normal Speech] : normal speech [Alert and Oriented] : alert and oriented [Normal memory] : normal memory [de-identified] : bilateral foot drop

## 2025-06-23 NOTE — CARDIOLOGY SUMMARY
[No Ischemia] : no Ischemia [LVEF ___%] : LVEF [unfilled]% [___] : [unfilled] [de-identified] : 4/15/25 NSR [de-identified] : 3/21/24-divya al, rare ectopy [de-identified] : 6/17/23 Pharmacologic nuclear stress test 1. Normal myocardial perfusion scan, with no evidence of infarction or inducible ischemia. 2. Baseline electrocardiogram: sinus bradycardia at a rate of 56 bpm with no arrhythmias and no sigificant ST abnomalities. 3. Stress electrocardiogram: No ischemic ST segment changes. 4. The left ventricle is normal in function and normal in size. 5. The stress left ventricular EF% is 77 %. 6. The resting left ventricular EF% is 75 %.  [de-identified] : TTE 2/12/25 1. Left ventricular cavity is normal in size. Left ventricular wall thickness is normal. Left ventricular systolic function is normal with an ejection fraction of 59 % by Glynn's method of disks. There are no regional wall motion abnormalities seen. 2. Normal left ventricular diastolic function. 3. Normal right ventricular cavity size, with normal wall thickness, and normal right ventricular systolic function. 4. Mild tricuspid regurgitation. 5. Estimated pulmonary artery systolic pressure is 20 mmHg, consistent with normal pulmonary artery pressure. 6. Trace mitral regurgitation. 7. Trace pulmonic regurgitation. 8. Compared to 6/21/23 findings are similar.  [de-identified] : 5/22-mild plaque without stenosis

## 2025-06-23 NOTE — REASON FOR VISIT
[FreeTextEntry1] : Jannet Bradshaw presents for follow up visit.  She is scheduled for colonoscopy tomorrow and then colectomy in 2 weeks for diverticulitis with history of abscess Urogyn will also be in attendance as she has mesh from previous surgery   Ms. Bradshaw denies chest pain, SOB, palpitations, dizziness or syncope.

## 2025-06-23 NOTE — HISTORY OF PRESENT ILLNESS
[FreeTextEntry1] : 81 year old female with hypertension, dyslipidemia, abnormal cardiac CTA, carotid artery atherosclerosis, orthostatic hypotension, anxiety.    S/p [facility] admission from 2/17-2/22/25 with intraabdominal abscess. On admission, afebrile with leukocytosis. S/p IR drainage of abscess on 2/20/25. Post procedure had rigors and abd pain. Found with elevated lactate, sepsis protocol initiated. She received broad spectrum antibiotics. She was discharged on Augmentin and probiotics.   She is following up with GI Dr. Isbell and colorectal surgery Dr. Kumari with a plan for colonoscopy and colorectal surgery 2 weeks after colonoscopy. Urogynecologist Dr. Juárez is also participating in her anticipated procedure.     She is also following with neurosurgery for lumbar radiculopathy and bilateral foot drop, possible laminectomy. She sought a second opinion and was advised to proceed with GI surgery first.  Continues with therapy and has noted improvement